# Patient Record
Sex: MALE | Race: WHITE | NOT HISPANIC OR LATINO | URBAN - METROPOLITAN AREA
[De-identification: names, ages, dates, MRNs, and addresses within clinical notes are randomized per-mention and may not be internally consistent; named-entity substitution may affect disease eponyms.]

---

## 2018-07-11 ENCOUNTER — EMERGENCY (EMERGENCY)
Facility: HOSPITAL | Age: 28
LOS: 1 days | Discharge: HOME | End: 2018-07-11
Attending: EMERGENCY MEDICINE
Payer: COMMERCIAL

## 2018-07-11 VITALS
WEIGHT: 190.04 LBS | RESPIRATION RATE: 16 BRPM | HEART RATE: 86 BPM | OXYGEN SATURATION: 97 % | DIASTOLIC BLOOD PRESSURE: 76 MMHG | SYSTOLIC BLOOD PRESSURE: 143 MMHG | TEMPERATURE: 98 F

## 2018-07-11 PROCEDURE — 29065 APPL CST SHO TO HAND LNG ARM: CPT | Mod: RT

## 2018-07-11 PROCEDURE — 99284 EMERGENCY DEPT VISIT MOD MDM: CPT

## 2018-07-11 PROCEDURE — 99284 EMERGENCY DEPT VISIT MOD MDM: CPT | Mod: 25

## 2018-07-11 RX ORDER — IBUPROFEN 200 MG
600 TABLET ORAL ONCE
Qty: 0 | Refills: 0 | Status: COMPLETED | OUTPATIENT
Start: 2018-07-11 | End: 2018-07-11

## 2018-07-11 RX ORDER — IBUPROFEN 200 MG
1 TABLET ORAL
Qty: 20 | Refills: 0 | OUTPATIENT
Start: 2018-07-11 | End: 2018-07-15

## 2018-07-11 RX ADMIN — Medication 600 MILLIGRAM(S): at 20:09

## 2018-07-11 RX ADMIN — Medication 600 MILLIGRAM(S): at 20:36

## 2018-07-11 NOTE — ED PROVIDER NOTE - ATTENDING CONTRIBUTION TO CARE
28 y/o M w/ no hx presents c/o RUE pain s/p trip and fall x yesterday   c/o wrist pain dx with fx at urgent care   no snuff box tenderness   NVI distally    ortho consult

## 2018-07-11 NOTE — ED PROVIDER NOTE - PHYSICAL EXAMINATION
MSK:  Decreased ROM of the R wrist. Distal radial tenderness. No snuffbox tenderness. no tenderness of the scaffold bone. R el bow w/ decreased ROM w/ tenderness to the radial head. Radial ulnar pulses intact 2+. Cap refill less than 2 sec. No sensory deficits. No spinal/ paraspinal tenderness. MSK:  Decreased ROM of the R wrist. Distal radial tenderness. No snuffbox tenderness. no tenderness of the scaphoid bone. R elbow w/ decreased ROM w/ tenderness to the radial head. Radial ulnar pulses intact 2+. Cap refill less than 2 sec. No sensory deficits. No spinal/ paraspinal tenderness.

## 2018-07-11 NOTE — ED PROVIDER NOTE - OBJECTIVE STATEMENT
28 y/o M w/ no hx presents c/o RUE pain s/p trip and fall x yesterday. Went to [facility] and had X-rays done and was refereed to the ED for confirmed fractures to the RUE. Denies any other complaints. NKDA.

## 2018-07-11 NOTE — CONSULT NOTE ADULT - SUBJECTIVE AND OBJECTIVE BOX
HPI: Patient is a 26 y/o M who presents to the ED c/o RUE pain s/p trip and fall x yesterday. Went to [facility] and had X-rays done and was refereed to the ED for confirmed fractures to the RUE    PAST MEDICAL & SURGICAL HISTORY:  No pertinent past medical history  No significant past surgical history    Review of systems: Non Contributory    MEDICATIONS  (STANDING):    Allergies: No known Allergies    Physical Examination:    Musculoskeletal:         Neurovascularly Intact    RADIOLOGY & ADDITIONAL STUDIES:    ASSESSMENT:    PLAN/RECOMMENDATION:    FOLLOW UP: HPI: Patient is a 26 y/o M who presents to the ED c/o right hand/wrist and right elbow pain and swelling. Patient fell yesterday and injured his right hand/wrist and right elbow. Patient was seen by [facility] and had X-rays performed that confirmed fractures of his RUE.     PAST MEDICAL & SURGICAL HISTORY:  No pertinent past medical history  No significant past surgical history    Review of systems: Non Contributory    MEDICATIONS  (STANDING):    Allergies: No known Allergies    Physical Examination:    Musculoskeletal:   Physical examination of right hand/wrist shows pain to palpation of the anatomical snuffbox area. There is swelling, ecchymosis and decreased in range of motion noted.      Physical examination of right elbow shows pain to palpation of the radial head area on the lateral side. There is swelling, ecchymosis and decreased in range of motion noted.      Neurovascularly Intact    RADIOLOGY & ADDITIONAL STUDIES: X-ray CD and report that was performed in an outpatient radiology facility was reviewed and shows: Right nondisplaced scaphoid tuberosity fracture, and right nondisplaced radial head fracture.      ASSESSMENT: Right scaphoid fracture, and right radial head fracture.     PLAN/RECOMMENDATION: Closed treatment of both fractures  and application of long arm thumb spica cast. Patient tolerated well. Shoulder sling was applied.    Keep right upper extremity elevated.    Take analgesics for pain.    FOLLOW UP: With office in 2 weeks

## 2018-07-11 NOTE — ED PROVIDER NOTE - CARE PLAN
Principal Discharge DX:	Closed nondisplaced fracture of distal pole of scaphoid bone of right wrist, initial encounter  Secondary Diagnosis:	Closed nondisplaced fracture of head of right radius, initial encounter

## 2018-07-11 NOTE — ED PROVIDER NOTE - PROGRESS NOTE DETAILS
Dr Zarate applied cast stabilizing radial head and scaphoid bones. Will dc with ortho follow up. Pt is well appearing walking with steady gait, stable for discharge and follow up without fail with medical doctor. I had a detailed discussion with the patient and/or guardian regarding the historical points, exam findings, and any diagnostic results supporting the discharge diagnosis. Pt educated on care and need for follow up. Strict return instructions and red flag signs and symptoms discussed with patient. Questions answered. Pt shows understanding of discharge information and agrees to follow.

## 2024-10-27 NOTE — ED PROVIDER NOTE - ATTESTATION, MLM
abdominal pain I have reviewed and confirmed nurses' notes for patient's medications, allergies, medical history, and surgical history.

## 2025-02-22 ENCOUNTER — EMERGENCY (EMERGENCY)
Facility: HOSPITAL | Age: 35
LOS: 1 days | Discharge: HOME | End: 2025-02-22
Attending: STUDENT IN AN ORGANIZED HEALTH CARE EDUCATION/TRAINING PROGRAM
Payer: MEDICAID

## 2025-02-22 VITALS
RESPIRATION RATE: 16 BRPM | DIASTOLIC BLOOD PRESSURE: 65 MMHG | WEIGHT: 210.1 LBS | OXYGEN SATURATION: 95 % | HEART RATE: 73 BPM | TEMPERATURE: 98 F | HEIGHT: 74 IN | SYSTOLIC BLOOD PRESSURE: 91 MMHG

## 2025-02-22 VITALS
DIASTOLIC BLOOD PRESSURE: 66 MMHG | SYSTOLIC BLOOD PRESSURE: 100 MMHG | TEMPERATURE: 98 F | RESPIRATION RATE: 16 BRPM | HEART RATE: 69 BPM | OXYGEN SATURATION: 98 %

## 2025-02-22 LAB
ALBUMIN SERPL ELPH-MCNC: 3.8 G/DL — SIGNIFICANT CHANGE UP (ref 3.5–5)
ALP SERPL-CCNC: 68 U/L — SIGNIFICANT CHANGE UP (ref 40–120)
ALT FLD-CCNC: 126 U/L DA — HIGH (ref 10–60)
ANION GAP SERPL CALC-SCNC: 5 MMOL/L — SIGNIFICANT CHANGE UP (ref 5–17)
APPEARANCE UR: CLEAR — SIGNIFICANT CHANGE UP
AST SERPL-CCNC: 51 U/L — HIGH (ref 10–40)
BACTERIA # UR AUTO: ABNORMAL /HPF
BASOPHILS # BLD AUTO: 0.05 K/UL — SIGNIFICANT CHANGE UP (ref 0–0.2)
BASOPHILS NFR BLD AUTO: 0.7 % — SIGNIFICANT CHANGE UP (ref 0–2)
BILIRUB SERPL-MCNC: 0.5 MG/DL — SIGNIFICANT CHANGE UP (ref 0.2–1.2)
BILIRUB UR-MCNC: NEGATIVE — SIGNIFICANT CHANGE UP
BUN SERPL-MCNC: 17 MG/DL — SIGNIFICANT CHANGE UP (ref 7–18)
CALCIUM SERPL-MCNC: 8.6 MG/DL — SIGNIFICANT CHANGE UP (ref 8.4–10.5)
CHLORIDE SERPL-SCNC: 110 MMOL/L — HIGH (ref 96–108)
CO2 SERPL-SCNC: 27 MMOL/L — SIGNIFICANT CHANGE UP (ref 22–31)
COLOR SPEC: YELLOW — SIGNIFICANT CHANGE UP
COMMENT - URINE: SIGNIFICANT CHANGE UP
CREAT SERPL-MCNC: 1.09 MG/DL — SIGNIFICANT CHANGE UP (ref 0.5–1.3)
DIFF PNL FLD: ABNORMAL
EGFR: 92 ML/MIN/1.73M2 — SIGNIFICANT CHANGE UP
EOSINOPHIL # BLD AUTO: 0.05 K/UL — SIGNIFICANT CHANGE UP (ref 0–0.5)
EOSINOPHIL NFR BLD AUTO: 0.7 % — SIGNIFICANT CHANGE UP (ref 0–6)
EPI CELLS # UR: PRESENT
GLUCOSE SERPL-MCNC: 133 MG/DL — HIGH (ref 70–99)
GLUCOSE UR QL: NEGATIVE MG/DL — SIGNIFICANT CHANGE UP
HCT VFR BLD CALC: 41.7 % — SIGNIFICANT CHANGE UP (ref 39–50)
HGB BLD-MCNC: 14.3 G/DL — SIGNIFICANT CHANGE UP (ref 13–17)
IMM GRANULOCYTES NFR BLD AUTO: 0.3 % — SIGNIFICANT CHANGE UP (ref 0–0.9)
KETONES UR-MCNC: ABNORMAL MG/DL
LACTATE SERPL-SCNC: 1.1 MMOL/L — SIGNIFICANT CHANGE UP (ref 0.7–2)
LEUKOCYTE ESTERASE UR-ACNC: NEGATIVE — SIGNIFICANT CHANGE UP
LIDOCAIN IGE QN: 29 U/L — SIGNIFICANT CHANGE UP (ref 13–75)
LYMPHOCYTES # BLD AUTO: 1.22 K/UL — SIGNIFICANT CHANGE UP (ref 1–3.3)
LYMPHOCYTES # BLD AUTO: 17.4 % — SIGNIFICANT CHANGE UP (ref 13–44)
MAGNESIUM SERPL-MCNC: 1.9 MG/DL — SIGNIFICANT CHANGE UP (ref 1.6–2.6)
MCHC RBC-ENTMCNC: 30.5 PG — SIGNIFICANT CHANGE UP (ref 27–34)
MCHC RBC-ENTMCNC: 34.3 G/DL — SIGNIFICANT CHANGE UP (ref 32–36)
MCV RBC AUTO: 88.9 FL — SIGNIFICANT CHANGE UP (ref 80–100)
MONOCYTES # BLD AUTO: 0.54 K/UL — SIGNIFICANT CHANGE UP (ref 0–0.9)
MONOCYTES NFR BLD AUTO: 7.7 % — SIGNIFICANT CHANGE UP (ref 2–14)
NEUTROPHILS # BLD AUTO: 5.15 K/UL — SIGNIFICANT CHANGE UP (ref 1.8–7.4)
NEUTROPHILS NFR BLD AUTO: 73.2 % — SIGNIFICANT CHANGE UP (ref 43–77)
NITRITE UR-MCNC: NEGATIVE — SIGNIFICANT CHANGE UP
NRBC BLD AUTO-RTO: 0 /100 WBCS — SIGNIFICANT CHANGE UP (ref 0–0)
PH UR: 5.5 — SIGNIFICANT CHANGE UP (ref 5–8)
PHOSPHATE SERPL-MCNC: 2 MG/DL — LOW (ref 2.5–4.5)
PLATELET # BLD AUTO: 183 K/UL — SIGNIFICANT CHANGE UP (ref 150–400)
POTASSIUM SERPL-MCNC: 4.5 MMOL/L — SIGNIFICANT CHANGE UP (ref 3.5–5.3)
POTASSIUM SERPL-SCNC: 4.5 MMOL/L — SIGNIFICANT CHANGE UP (ref 3.5–5.3)
PROT SERPL-MCNC: 7 G/DL — SIGNIFICANT CHANGE UP (ref 6–8.3)
PROT UR-MCNC: ABNORMAL MG/DL
RBC # BLD: 4.69 M/UL — SIGNIFICANT CHANGE UP (ref 4.2–5.8)
RBC # FLD: 11.5 % — SIGNIFICANT CHANGE UP (ref 10.3–14.5)
RBC CASTS # UR COMP ASSIST: 15 /HPF — HIGH (ref 0–4)
SODIUM SERPL-SCNC: 142 MMOL/L — SIGNIFICANT CHANGE UP (ref 135–145)
SP GR SPEC: 1.03 — SIGNIFICANT CHANGE UP (ref 1–1.03)
UROBILINOGEN FLD QL: 0.2 MG/DL — SIGNIFICANT CHANGE UP (ref 0.2–1)
WBC # BLD: 7.03 K/UL — SIGNIFICANT CHANGE UP (ref 3.8–10.5)
WBC # FLD AUTO: 7.03 K/UL — SIGNIFICANT CHANGE UP (ref 3.8–10.5)
WBC UR QL: 2 /HPF — SIGNIFICANT CHANGE UP (ref 0–5)

## 2025-02-22 PROCEDURE — 99285 EMERGENCY DEPT VISIT HI MDM: CPT

## 2025-02-22 PROCEDURE — 81001 URINALYSIS AUTO W/SCOPE: CPT

## 2025-02-22 PROCEDURE — 74176 CT ABD & PELVIS W/O CONTRAST: CPT | Mod: 26

## 2025-02-22 PROCEDURE — 99284 EMERGENCY DEPT VISIT MOD MDM: CPT | Mod: 25

## 2025-02-22 PROCEDURE — 74176 CT ABD & PELVIS W/O CONTRAST: CPT | Mod: MC

## 2025-02-22 PROCEDURE — 80053 COMPREHEN METABOLIC PANEL: CPT

## 2025-02-22 PROCEDURE — 85025 COMPLETE CBC W/AUTO DIFF WBC: CPT

## 2025-02-22 PROCEDURE — 84100 ASSAY OF PHOSPHORUS: CPT

## 2025-02-22 PROCEDURE — 83690 ASSAY OF LIPASE: CPT

## 2025-02-22 PROCEDURE — 96375 TX/PRO/DX INJ NEW DRUG ADDON: CPT

## 2025-02-22 PROCEDURE — 83735 ASSAY OF MAGNESIUM: CPT

## 2025-02-22 PROCEDURE — 83605 ASSAY OF LACTIC ACID: CPT

## 2025-02-22 PROCEDURE — 96374 THER/PROPH/DIAG INJ IV PUSH: CPT

## 2025-02-22 PROCEDURE — 36415 COLL VENOUS BLD VENIPUNCTURE: CPT

## 2025-02-22 RX ORDER — SODIUM PHOSPHATE, DIBASIC, ANHYDROUS, POTASSIUM PHOSPHATE, MONOBASIC, AND SODIUM PHOSPHATE, MONOBASIC, MONOHYDRATE 852; 155; 130 MG/1; MG/1; MG/1
1 TABLET, COATED ORAL ONCE
Refills: 0 | Status: COMPLETED | OUTPATIENT
Start: 2025-02-22 | End: 2025-02-22

## 2025-02-22 RX ORDER — KETOROLAC TROMETHAMINE 10 MG
15 TABLET ORAL ONCE
Refills: 0 | Status: DISCONTINUED | OUTPATIENT
Start: 2025-02-22 | End: 2025-02-22

## 2025-02-22 RX ORDER — ONDANSETRON 4 MG/1
4 TABLET, ORALLY DISINTEGRATING ORAL ONCE
Refills: 0 | Status: COMPLETED | OUTPATIENT
Start: 2025-02-22 | End: 2025-02-22

## 2025-02-22 RX ORDER — TAMSULOSIN HYDROCHLORIDE 0.4 MG/1
1 CAPSULE ORAL
Qty: 7 | Refills: 0
Start: 2025-02-22 | End: 2025-02-28

## 2025-02-22 RX ORDER — ONDANSETRON 4 MG/1
1 TABLET, ORALLY DISINTEGRATING ORAL
Qty: 10 | Refills: 0
Start: 2025-02-22 | End: 2025-02-26

## 2025-02-22 RX ORDER — MORPHINE SULFATE 60 MG/1
4 TABLET, FILM COATED, EXTENDED RELEASE ORAL ONCE
Refills: 0 | Status: DISCONTINUED | OUTPATIENT
Start: 2025-02-22 | End: 2025-02-22

## 2025-02-22 RX ORDER — BACTERIOSTATIC SODIUM CHLORIDE 0.9 %
1000 VIAL (ML) INJECTION ONCE
Refills: 0 | Status: COMPLETED | OUTPATIENT
Start: 2025-02-22 | End: 2025-02-22

## 2025-02-22 RX ADMIN — MORPHINE SULFATE 4 MILLIGRAM(S): 60 TABLET, FILM COATED, EXTENDED RELEASE ORAL at 17:00

## 2025-02-22 RX ADMIN — ONDANSETRON 4 MILLIGRAM(S): 4 TABLET, ORALLY DISINTEGRATING ORAL at 16:00

## 2025-02-22 RX ADMIN — Medication 1000 MILLILITER(S): at 16:00

## 2025-02-22 RX ADMIN — Medication 15 MILLIGRAM(S): at 17:00

## 2025-02-22 RX ADMIN — MORPHINE SULFATE 4 MILLIGRAM(S): 60 TABLET, FILM COATED, EXTENDED RELEASE ORAL at 16:00

## 2025-02-22 RX ADMIN — SODIUM PHOSPHATE, DIBASIC, ANHYDROUS, POTASSIUM PHOSPHATE, MONOBASIC, AND SODIUM PHOSPHATE, MONOBASIC, MONOHYDRATE 1 PACKET(S): 852; 155; 130 TABLET, COATED ORAL at 17:36

## 2025-02-22 RX ADMIN — Medication 15 MILLIGRAM(S): at 16:01

## 2025-02-22 NOTE — ED ADULT TRIAGE NOTE - CHIEF COMPLAINT QUOTE
C/o left flank pain radiating to left groin. Received Tylenol 1000mg, Motrin 400mg and morphine 7mg IV for pain score 10/10 by EMS

## 2025-02-22 NOTE — ED PROVIDER NOTE - NSFOLLOWUPINSTRUCTIONS_ED_ALL_ED_FT
Bladder Stone    A bladder stone is a buildup of crystals made from the proteins and minerals found in urine. These substances build up when urine becomes too concentrated. Urine is concentrated when there is less water and more proteins and minerals in it.    Bladder stones usually develop when a person has another medical condition that prevents the bladder from emptying completely. Crystals can form in the small amount of urine that is left in the bladder. Bladder stones that grow large can become painful and may block the flow of urine.    What are the causes?  This condition may be caused by:  An enlarged prostate, which prevents the bladder from emptying well.  An infection of a part of your urinary system (urinary tract infection, or UTI). This includes the:  Kidneys.  Bladder.  Ureters. These are the tubes that carry urine to your bladder.  Urethra. This is the tube that drains urine from your bladder.  A weak spot in the bladder that creates a small pouch (bladder diverticulum).  Nerve damage that may interfere with the signals from your brain to your bladder muscles (neurogenic bladder). This can result from conditions such as Parkinson's disease or spinal cord injuries.  What increases the risk?  This condition is more likely to develop in people who:  Get frequent UTIs.  Have another medical condition that affects the bladder.  Have a history of bladder surgery.  Have a spinal cord injury.  Have an abnormal shape of the bladder (deformity).  What are the signs or symptoms?  Common symptoms of this condition include:  Pain in the abdomen.  A need to urinate more often.  Difficulty or pain when urinating.  Blood in the urine.  Cloudy urine or urine that is dark in color.  Pain in the penis or testicles in men.  Small bladder stones do not always cause symptoms.    How is this diagnosed?  This condition may be diagnosed based on your symptoms, medical history, and physical exam. The physical exam will check for tenderness in your abdomen. For men, an exam in the rectum may be done to check the prostate gland.  You may have tests, such as:  A urine test (urinalysis).  A urine sample test to check for other infections (culture).  Blood tests, including tests to look for a certain substance (creatinine). A creatinine level that is higher than normal could indicate a blockage.  A procedure to check your bladder using a scope with a camera (cystoscopy).  You may also have imaging studies, such as:  CT scan or ultrasound of your abdomen and the area between your hip bones (pelvis or pelvic area).  An X-ray of your urinary system.  How is this treated?  This condition may be treated with:  Cystolitholapaxy. This procedure uses a laser, ultrasound, or other device to break the stone into smaller pieces. Fluids are used to flush the small pieces from the area.  Surgery to remove the stone.  A stent. This is a small mesh tube that is threaded into your ureter to make urine flow.  Medicines to treat pain.  Follow these instructions at home:  Medicines    Take over-the-counter and prescription medicines only as told by your health care provider.  Ask your health care provider if the medicine prescribed to you:  Requires you to avoid driving or using heavy machinery.  Can cause constipation. You may need to take these actions to prevent or treat constipation:  Take over-the-counter or prescription medicines.  Eat foods that are high in fiber, such as beans, whole grains, and fresh fruits and vegetables.  Limit foods that are high in fat and processed sugars, such as fried or sweet foods.  Alcohol use    Do not drink alcohol if:  Your health care provider tells you not to drink.  You are pregnant, may be pregnant, or are planning to become pregnant.  If you drink alcohol:  Limit how much you drink to:  0–1 drink a day for women.  0–2 drinks a day for men.  Be aware of how much alcohol is in your drink. In the [place] one drink equals one 12 oz bottle of beer (355 mL), one 5 oz glass of wine (148 mL), or one 1½ oz glass of hard liquor (44 mL).  Activity    Rest as told by your health care provider.  Return to your normal activities as told by your health care provider. Ask your health care provider what activities are safe for you.  General instructions      Drink enough fluid to keep your urine pale yellow.  Tell your health care provider about any unusual symptoms related to urinating. Early diagnosis of an enlarged prostate and other bladder conditions may reduce your risk of getting bladder stones.  Do not use any products that contain nicotine or tobacco, such as cigarettes, e-cigarettes, or chewing tobacco. If you need help quitting, ask your health care provider.  Do not use drugs.  Where to find more information  [facility] ([facility]): www.urologyhealth.org    Contact a health care provider if you:  Have a fever.  Feel nauseous or vomit.  Are unable to urinate.  Have a large amount of blood in your urine.  Get help right away if you:  Have severe back pain or pain in the lower part of your abdomen.  Cannot eat or drink without vomiting.  Vomit after taking your medicine.  Summary  A bladder stone is a buildup of crystals made from the proteins and minerals found in urine. These substances build up when urine becomes too concentrated.  Bladder stones that grow large can become painful and may block the flow of urine.  Bladder stones may be treated with a laser, a stent, surgery, or pain medicines.  This information is not intended to replace advice given to you by your health care provider. Make sure you discuss any questions you have with your health care provider.

## 2025-02-22 NOTE — ED ADULT NURSE NOTE - NS ED NOTE ABUSE SUSPICION NEGLECT YN
Take your medications exactly as prescribed. Having your pain under control will help increase activity, improve deep breathing and coughing and prevent complications like pneumonia and blood clots in your legs. Some of the common side effects of pain medications are nausea, vomiting, itching, rash and upset stomach. Contact your doctor if you develop these or any other unusual systoms. Eat a diet rich in fiber and drink plenty of oral fluids. Use other pain management methods like, cold and warm applications, elevation of affected body part, listening to music, watching TV, yoga, etc. No

## 2025-02-22 NOTE — ED PROVIDER NOTE - PHYSICAL EXAMINATION
Gen: no acute distress. uncomfortable appearing   Head: normocephalic, atraumatic  Lung: CTAB, no respiratory distress, no wheezing, rales, rhonchi  CV: normal s1/s2, rrr,   Abd: soft, non-tender, non-distended, No rebound guarding or peritoneal signs, left CVA tenderness noted  MSK: full range of motion in all 4 extremities  Neuro: No focal neurologic deficits

## 2025-02-22 NOTE — ED PROVIDER NOTE - OBJECTIVE STATEMENT
Patient is a 34-year-old male with no significant past medical or past surgical history presenting with left flank pain rating to the left groin since this morning.  As per patient and wife at bedside, patient developed dull constant left flank pain radiating to the groin this morning.  Patient received Tylenol, 7 mg morphine prior to arrival.  Denies any past medical history, allergies, surgeries, daily alcohol use.  Patient is smokes marijuana and vapes daily, denies any other drug use.  Denies any chest pain, shortness of breath, fever, dysuria, hematuria, difficulty urinating. Patient is a 34-year-old male with no significant past medical or past surgical history presenting with left flank pain radiating to the left groin since this morning.  As per patient and wife at bedside, patient developed dull constant left flank pain radiating to the groin this morning.  Patient received Tylenol, 7 mg morphine prior to arrival.  Denies any past medical history, allergies, surgeries, daily alcohol use.  Patient is smokes marijuana and vapes daily, denies any other drug use.  Denies any chest pain, shortness of breath, fever, dysuria, hematuria, difficulty urinating.

## 2025-02-22 NOTE — ED ADULT TRIAGE NOTE - NS ED NURSE DIRECT TO ROOM YN
How Severe Are Your Spot(S)?: mild What Is The Reason For Today's Visit?: Full Body Skin Examination What Is The Reason For Today's Visit? (Being Monitored For X): concerning skin lesions on an annual basis Yes not applicable (Male)

## 2025-02-22 NOTE — ED PROVIDER NOTE - NSFOLLOWUPCLINICS_GEN_ALL_ED_FT
[facility]  Urology  [address] [place] [address]  Phone: [phone]  Fax: [phone]  Follow Up Time: Routine

## 2025-02-22 NOTE — ED PROVIDER NOTE - PROGRESS NOTE DETAILS
labs wnl, electrolytes repleted. VSS belly soft no peritoneal signs. Symptoms resolved. CT showing 2 mm bladder calculus, no hydro. Ambulatory. Ready for DC with urology f.u.

## 2025-02-22 NOTE — ED ADULT NURSE NOTE - OBJECTIVE STATEMENT
pt is a 35 y/o male with c/o left flank pain radiating  to the groin + nausea  . pt pain started this morning and got worst.

## 2025-02-22 NOTE — ED PROVIDER NOTE - PATIENT PORTAL LINK FT
You can access the FollowMyHealth Patient Portal offered by [facility] by registering at the following website: [email or web address] By joining [email or web address]’s FollowMyHealth portal, you will also be able to view your health information using other applications (apps) compatible with our system.

## 2025-02-22 NOTE — ED PROVIDER NOTE - CLINICAL SUMMARY MEDICAL DECISION MAKING FREE TEXT BOX
Patient is a 34-year-old male with no significant past medical or past surgical history presenting with left flank pain rating to the left groin since this morning. Patient received Tylenol, 7 mg morphine prior to arrival.  Hypotensive, vitals stable otherwise, uncomfortable appearing, no acute distress, belly soft no peritoneal signs, left CVA tenderness.  – Will treat pain, hydrate, antiemetics, check labs and rule out electrolyte abnormalities or pancreatitis, lactate to eval for end organ dysfunction, urinalysis to rule out urinary tract infection, CT renal stone hard to rule out nephrolithiasis, reassess Patient is a 34-year-old male with no significant past medical or past surgical history presenting with left flank pain radiating to the left groin since this morning. Patient received Tylenol, 7 mg morphine prior to arrival.  Hypotensive, vitals stable otherwise, uncomfortable appearing, no acute distress, belly soft no peritoneal signs, left CVA tenderness.  – Will treat pain, hydrate, antiemetics, check labs and rule out electrolyte abnormalities or pancreatitis, lactate to eval for end organ dysfunction, urinalysis to rule out urinary tract infection, CT renal stone hard to rule out nephrolithiasis, reassess

## 2025-02-27 ENCOUNTER — APPOINTMENT (OUTPATIENT)
Dept: UROLOGY | Facility: CLINIC | Age: 35
End: 2025-02-27